# Patient Record
Sex: MALE | Race: WHITE | HISPANIC OR LATINO | Employment: FULL TIME | URBAN - METROPOLITAN AREA
[De-identification: names, ages, dates, MRNs, and addresses within clinical notes are randomized per-mention and may not be internally consistent; named-entity substitution may affect disease eponyms.]

---

## 2020-08-18 ENCOUNTER — OFFICE VISIT (OUTPATIENT)
Dept: INTERNAL MEDICINE | Facility: CLINIC | Age: 35
End: 2020-08-18
Payer: COMMERCIAL

## 2020-08-18 VITALS
WEIGHT: 167.13 LBS | SYSTOLIC BLOOD PRESSURE: 124 MMHG | RESPIRATION RATE: 12 BRPM | BODY MASS INDEX: 26.23 KG/M2 | HEART RATE: 72 BPM | HEIGHT: 67 IN | DIASTOLIC BLOOD PRESSURE: 80 MMHG

## 2020-08-18 DIAGNOSIS — L72.9 SUBCUTANEOUS CYST: ICD-10-CM

## 2020-08-18 DIAGNOSIS — G11.4 HEREDITARY SPASTIC PARAPLEGIA: ICD-10-CM

## 2020-08-18 DIAGNOSIS — Z76.89 ENCOUNTER TO ESTABLISH CARE: Primary | ICD-10-CM

## 2020-08-18 PROCEDURE — 99203 OFFICE O/P NEW LOW 30 MIN: CPT | Mod: S$GLB,,, | Performed by: STUDENT IN AN ORGANIZED HEALTH CARE EDUCATION/TRAINING PROGRAM

## 2020-08-18 PROCEDURE — 99999 PR PBB SHADOW E&M-NEW PATIENT-LVL IV: CPT | Mod: PBBFAC,,, | Performed by: STUDENT IN AN ORGANIZED HEALTH CARE EDUCATION/TRAINING PROGRAM

## 2020-08-18 PROCEDURE — 99999 PR PBB SHADOW E&M-NEW PATIENT-LVL IV: ICD-10-PCS | Mod: PBBFAC,,, | Performed by: STUDENT IN AN ORGANIZED HEALTH CARE EDUCATION/TRAINING PROGRAM

## 2020-08-18 PROCEDURE — 99203 PR OFFICE/OUTPT VISIT, NEW, LEVL III, 30-44 MIN: ICD-10-PCS | Mod: S$GLB,,, | Performed by: STUDENT IN AN ORGANIZED HEALTH CARE EDUCATION/TRAINING PROGRAM

## 2020-08-18 NOTE — PROGRESS NOTES
[facility]  CLINIC NOTE    Patient Name: Behzad Carvajal  YOB: 1985    PRESENTING HISTORY       History of Present Illness:  Mr. Behzad Carvajal is a 35 y.o. male w/ significant PMHx of hereditary spastic neuralgia, is here to establish care. Patient uses crutches to ambulate at baseline. Reports bilateral lower extremity weakness and gait imbalance from the disease. No spasms reported.  Diagnosed at the age of 15, had a tendon surgery done later on both knees but that helped symptoms only for some time before he had to start using the crutches. No urinary or bowel incontinence reported. No weakness or paresthesias in UE reported. Sensation is intact in LE. No other complaints at this time. Reports a skin pimple of lower right back that his wife tried to pop open but could not drain it out well and thus now has developed chronic subcutaneous 2x2 cm cyst which is not painful or tender and does not drain any pus.   Non smoker, occasional alcohol, no recreational drug use  , one kid, sexually active, no history of STD  Dentist, teaches at [employer]. Originally from [place].   No outpatient medications  Review of Systems   Constitutional: Negative for chills, fever and malaise/fatigue.   HENT: Negative for congestion and sore throat.    Respiratory: Negative for cough, hemoptysis, sputum production and shortness of breath.    Cardiovascular: Negative for chest pain, palpitations, orthopnea, leg swelling and PND.   Gastrointestinal: Negative for abdominal pain, constipation, diarrhea, heartburn, nausea and vomiting.   Genitourinary: Negative for dysuria and hematuria.   Musculoskeletal: Negative for back pain, falls, joint pain and myalgias.   Skin: Negative for rash.   Neurological: Positive for weakness (LE). Negative for dizziness, tingling, tremors, sensory change and headaches.   Psychiatric/Behavioral: Negative for depression. The patient is not nervous/anxious  "and does not have insomnia.        PAST HISTORY:     Past Medical History:   Diagnosis Date    Hereditary spastic paraplegia        No past surgical history on file.    Family History   Problem Relation Age of Onset    Diabetes Mellitus Father        Social History     Socioeconomic History    Marital status:      Spouse name: Not on file    Number of children: Not on file    Years of education: Not on file    Highest education level: Not on file   Occupational History    Not on file   Social Needs    Financial resource strain: Not on file    Food insecurity     Worry: Not on file     Inability: Not on file    Transportation needs     Medical: Not on file     Non-medical: Not on file   Tobacco Use    Smoking status: Never Smoker   Substance and Sexual Activity    Alcohol use: Never     Frequency: Never    Drug use: Never    Sexual activity: Yes   Lifestyle    Physical activity     Days per week: Not on file     Minutes per session: Not on file    Stress: Not on file   Relationships    Social connections     Talks on phone: Not on file     Gets together: Not on file     Attends Scientologist service: Not on file     Active member of club or organization: Not on file     Attends meetings of clubs or organizations: Not on file     Relationship status: Not on file   Other Topics Concern    Not on file   Social History Narrative    Not on file       MEDICATIONS & ALLERGIES:     No current outpatient medications on file prior to visit.     No current facility-administered medications on file prior to visit.        Review of patient's allergies indicates:  No Known Allergies    OBJECTIVE:   Vital Signs:  Vitals:    08/18/20 1554   BP: 124/80   Pulse: 72   Resp: 12   Weight: 75.8 kg (167 lb 1.7 oz)   Height: 5' 7" (1.702 m)       No results found for this or any previous visit (from the past 24 hour(s)).      Physical Exam   Constitutional: He is oriented to person, place, and time and well-developed, " well-nourished, and in no distress. No distress.   HENT:   Head: Normocephalic and atraumatic.   Mouth/Throat: Oropharynx is clear and moist. No oropharyngeal exudate.   Eyes: Pupils are equal, round, and reactive to light. Conjunctivae are normal. Right eye exhibits no discharge. Left eye exhibits no discharge. No scleral icterus.   Neck: Normal range of motion. Neck supple. No JVD present. No thyromegaly present.   Cardiovascular: Normal rate, regular rhythm, normal heart sounds and intact distal pulses.   Pulmonary/Chest: Effort normal and breath sounds normal. No respiratory distress. He has no wheezes. He has no rales.   Abdominal: Soft. Bowel sounds are normal. He exhibits no distension. There is no abdominal tenderness.   Musculoskeletal: Normal range of motion.         General: No tenderness or edema.   Lymphadenopathy:     He has no cervical adenopathy.   Neurological: He is alert and oriented to person, place, and time. No cranial nerve deficit.   Uses crutches for gait imbalance and paraplegic LE   Skin: Skin is warm and dry. He is not diaphoretic.   Psychiatric: Mood and affect normal.       ASSESSMENT & PLAN:     Diagnoses and all orders for this visit:  To establish care.     Encounter to establish care  -     Lipid Panel; Future  -     Hemoglobin A1C; Future  -     HIV 1 / 2 ANTIBODY; Future    Hereditary spastic paraplegia  -     Ambulatory referral/consult to Neurology; Future  -     CBC auto differential; Future  -     Comprehensive metabolic panel; Future    Subcutaneous cyst  -     Ambulatory referral/consult to General Surgery; Future    RTC in 1 year with labs or as needed earlier  Care discussed with Dr Roya SIDHU  Internal Medicine PGY-2       I have personally seen and examined patient and agree with the A/P as noted above.  Brittny Pugh.

## 2020-09-01 ENCOUNTER — TELEPHONE (OUTPATIENT)
Dept: SURGERY | Facility: CLINIC | Age: 35
End: 2020-09-01

## 2020-09-01 NOTE — TELEPHONE ENCOUNTER
Called the patient about his status for his 9:00AM appointment. Provided clinic telephone number to reschedule if needed.

## 2020-10-06 ENCOUNTER — PATIENT MESSAGE (OUTPATIENT)
Dept: INTERNAL MEDICINE | Facility: CLINIC | Age: 35
End: 2020-10-06

## 2022-08-25 NOTE — PROGRESS NOTES
Paramjit Guzman RN  P [facility] Procedures Msg Pool  Please cancel Caudal KEKE that patient has in September, no longer experiencing pain.      Patient examined, record reviewed, agree with findings and recommendations as documented above.